# Patient Record
Sex: MALE | Race: WHITE | NOT HISPANIC OR LATINO | Employment: FULL TIME | URBAN - METROPOLITAN AREA
[De-identification: names, ages, dates, MRNs, and addresses within clinical notes are randomized per-mention and may not be internally consistent; named-entity substitution may affect disease eponyms.]

---

## 2017-09-07 ENCOUNTER — OFFICE VISIT - RIVER FALLS (OUTPATIENT)
Dept: FAMILY MEDICINE | Facility: CLINIC | Age: 22
End: 2017-09-07

## 2017-09-07 ASSESSMENT — MIFFLIN-ST. JEOR: SCORE: 1845.82

## 2018-05-24 ENCOUNTER — OFFICE VISIT - RIVER FALLS (OUTPATIENT)
Dept: FAMILY MEDICINE | Facility: CLINIC | Age: 23
End: 2018-05-24

## 2018-05-24 ASSESSMENT — MIFFLIN-ST. JEOR: SCORE: 1869.41

## 2020-02-04 ENCOUNTER — OFFICE VISIT - HEALTHEAST (OUTPATIENT)
Dept: FAMILY MEDICINE | Facility: CLINIC | Age: 25
End: 2020-02-04

## 2020-02-04 ENCOUNTER — COMMUNICATION - HEALTHEAST (OUTPATIENT)
Dept: TELEHEALTH | Facility: CLINIC | Age: 25
End: 2020-02-04

## 2020-02-04 DIAGNOSIS — R10.31 ABDOMINAL PAIN, RIGHT LOWER QUADRANT: ICD-10-CM

## 2020-02-04 LAB
BASOPHILS # BLD AUTO: 0 THOU/UL (ref 0–0.2)
BASOPHILS NFR BLD AUTO: 0 % (ref 0–2)
EOSINOPHIL # BLD AUTO: 0.1 THOU/UL (ref 0–0.4)
EOSINOPHIL NFR BLD AUTO: 1 % (ref 0–6)
ERYTHROCYTE [DISTWIDTH] IN BLOOD BY AUTOMATED COUNT: 13 % (ref 11–14.5)
HCT VFR BLD AUTO: 52.9 % (ref 40–54)
HGB BLD-MCNC: 18.2 G/DL (ref 14–18)
LYMPHOCYTES # BLD AUTO: 1.2 THOU/UL (ref 0.8–4.4)
LYMPHOCYTES NFR BLD AUTO: 18 % (ref 20–40)
MCH RBC QN AUTO: 28.8 PG (ref 27–34)
MCHC RBC AUTO-ENTMCNC: 34.4 G/DL (ref 32–36)
MCV RBC AUTO: 84 FL (ref 80–100)
MONOCYTES # BLD AUTO: 0.7 THOU/UL (ref 0–0.9)
MONOCYTES NFR BLD AUTO: 11 % (ref 2–10)
NEUTROPHILS # BLD AUTO: 4.5 THOU/UL (ref 2–7.7)
NEUTROPHILS NFR BLD AUTO: 70 % (ref 50–70)
PLATELET # BLD AUTO: 293 THOU/UL (ref 140–440)
PMV BLD AUTO: 10.7 FL (ref 8.5–12.5)
RBC # BLD AUTO: 6.33 MILL/UL (ref 4.4–6.2)
WBC: 6.5 THOU/UL (ref 4–11)

## 2020-02-04 ASSESSMENT — MIFFLIN-ST. JEOR: SCORE: 2042.34

## 2020-02-05 ENCOUNTER — COMMUNICATION - HEALTHEAST (OUTPATIENT)
Dept: SCHEDULING | Facility: CLINIC | Age: 25
End: 2020-02-05

## 2020-04-21 ENCOUNTER — OFFICE VISIT - HEALTHEAST (OUTPATIENT)
Dept: FAMILY MEDICINE | Facility: CLINIC | Age: 25
End: 2020-04-21

## 2020-04-21 DIAGNOSIS — R11.2 NAUSEA AND VOMITING, INTRACTABILITY OF VOMITING NOT SPECIFIED, UNSPECIFIED VOMITING TYPE: ICD-10-CM

## 2020-04-22 ENCOUNTER — COMMUNICATION - HEALTHEAST (OUTPATIENT)
Dept: FAMILY MEDICINE | Facility: CLINIC | Age: 25
End: 2020-04-22

## 2020-04-22 DIAGNOSIS — R11.2 NAUSEA AND VOMITING, INTRACTABILITY OF VOMITING NOT SPECIFIED, UNSPECIFIED VOMITING TYPE: ICD-10-CM

## 2020-04-24 ENCOUNTER — OFFICE VISIT - HEALTHEAST (OUTPATIENT)
Dept: FAMILY MEDICINE | Facility: CLINIC | Age: 25
End: 2020-04-24

## 2020-04-24 ENCOUNTER — COMMUNICATION - HEALTHEAST (OUTPATIENT)
Dept: FAMILY MEDICINE | Facility: CLINIC | Age: 25
End: 2020-04-24

## 2020-04-24 DIAGNOSIS — R05.9 COUGH: ICD-10-CM

## 2020-04-24 DIAGNOSIS — R19.7 DIARRHEA, UNSPECIFIED TYPE: ICD-10-CM

## 2020-04-24 DIAGNOSIS — R11.2 INTRACTABLE VOMITING WITH NAUSEA, UNSPECIFIED VOMITING TYPE: ICD-10-CM

## 2020-04-24 DIAGNOSIS — R50.9 FEVER, UNSPECIFIED FEVER CAUSE: ICD-10-CM

## 2020-04-24 LAB
ALBUMIN SERPL-MCNC: 4.2 G/DL (ref 3.5–5)
ALP SERPL-CCNC: 129 U/L (ref 45–120)
ALT SERPL W P-5'-P-CCNC: 71 U/L (ref 0–45)
ANION GAP SERPL CALCULATED.3IONS-SCNC: 12 MMOL/L (ref 5–18)
AST SERPL W P-5'-P-CCNC: 41 U/L (ref 0–40)
BASOPHILS # BLD AUTO: 0 THOU/UL (ref 0–0.2)
BASOPHILS NFR BLD AUTO: 0 % (ref 0–2)
BILIRUB SERPL-MCNC: 1 MG/DL (ref 0–1)
BUN SERPL-MCNC: 11 MG/DL (ref 8–22)
CALCIUM SERPL-MCNC: 9.9 MG/DL (ref 8.5–10.5)
CHLORIDE BLD-SCNC: 101 MMOL/L (ref 98–107)
CO2 SERPL-SCNC: 26 MMOL/L (ref 22–31)
CREAT SERPL-MCNC: 1.01 MG/DL (ref 0.7–1.3)
DEPRECATED S PYO AG THROAT QL EIA: NORMAL
EOSINOPHIL # BLD AUTO: 0 THOU/UL (ref 0–0.4)
EOSINOPHIL NFR BLD AUTO: 0 % (ref 0–6)
ERYTHROCYTE [DISTWIDTH] IN BLOOD BY AUTOMATED COUNT: 12.7 % (ref 11–14.5)
GFR SERPL CREATININE-BSD FRML MDRD: >60 ML/MIN/1.73M2
GLUCOSE BLD-MCNC: 91 MG/DL (ref 70–125)
HCT VFR BLD AUTO: 46 % (ref 40–54)
HGB BLD-MCNC: 16.2 G/DL (ref 14–18)
LIPASE SERPL-CCNC: 13 U/L (ref 0–52)
LYMPHOCYTES # BLD AUTO: 1.3 THOU/UL (ref 0.8–4.4)
LYMPHOCYTES NFR BLD AUTO: 14 % (ref 20–40)
MCH RBC QN AUTO: 28.9 PG (ref 27–34)
MCHC RBC AUTO-ENTMCNC: 35.2 G/DL (ref 32–36)
MCV RBC AUTO: 82 FL (ref 80–100)
MONOCYTES # BLD AUTO: 0.2 THOU/UL (ref 0–0.9)
MONOCYTES NFR BLD AUTO: 3 % (ref 2–10)
NEUTROPHILS # BLD AUTO: 7.7 THOU/UL (ref 2–7.7)
NEUTROPHILS NFR BLD AUTO: 82 % (ref 50–70)
PLATELET # BLD AUTO: 399 THOU/UL (ref 140–440)
PMV BLD AUTO: 10.5 FL (ref 8.5–12.5)
POTASSIUM BLD-SCNC: 3.6 MMOL/L (ref 3.5–5)
PROT SERPL-MCNC: 8.1 G/DL (ref 6–8)
RBC # BLD AUTO: 5.61 MILL/UL (ref 4.4–6.2)
SODIUM SERPL-SCNC: 139 MMOL/L (ref 136–145)
WBC: 9.3 THOU/UL (ref 4–11)

## 2020-04-25 ENCOUNTER — VIRTUAL VISIT (OUTPATIENT)
Dept: FAMILY MEDICINE | Facility: OTHER | Age: 25
End: 2020-04-25

## 2020-04-25 ENCOUNTER — NURSE TRIAGE (OUTPATIENT)
Dept: NURSING | Facility: CLINIC | Age: 25
End: 2020-04-25

## 2020-04-25 LAB — GROUP A STREP BY PCR: NORMAL

## 2020-04-25 NOTE — TELEPHONE ENCOUNTER
"Summary: NONE     Reason for call:  Symptom   Symptom or request: COVID 19 symptoms; fever ; shortness of breath; sweating; nausea; dizzy;  Was seen yesterday at [facility]; patient had a CT done.  Needs results and would like to know what he should be doing right now.     Duration (how long have symptoms been present): 4 days   Have you been treated for this before? Yes     Additional comments: [facility]     Phone number to reach patient:  Home number on file [phone] (home)     Best Time:  ANYTIME     Can we leave a detailed message on this number?  YES     Travel screening: Not Applicable           Outbound call to patient re: above message.  Patient says he started getting sick 2 weeks ago Wed with nauseas/vomiting, nightly sweating, and diarrhea.  Patient says on Wed, he started having a fever started and shortness of breath.  Patient says he was seen yesterday, a \"CT of abdomen\" done and was diagnosed \"mild walking pneumonia.\"  Patient report some chest pain when breathes in deep but says is feeling much better today.  Patient was started on an antibiotic for his pneumonia and prilosec.  Patient says he is still having a productive coughing, diarrhea, vomiting and fever/chills (temp going up to 100.4 F).  Patient has questions about home remedies for nausea.  Reviewed care advice with caller per RN triage protocol.  FNA advised to call back with any concerns.   Caller verbalized understanding and agrees with plan.      COVID 19 Nurse Triage Plan/Patient Instructions    Please be aware that novel coronavirus (COVID-19) may be circulating in the community. If you develop symptoms such as fever, cough, or SOB or if you have concerns about the presence of another infection including coronavirus (COVID-19), please contact your health care provider or visit [email or web address].     Disposition/Instructions    Patient to stay at home and follow home care protocol based instructions.     Thank you for " limiting contact with others, wearing a simple mask to cover your cough, practice good hand hygiene habits and accessing our virtual services where possible to limit the spread of this virus.    For more information about COVID19 and options for caring for yourself at home, please visit the CDC website at https://www.cdc.gov/coronavirus/2019-ncov/about/steps-when-sick.html  For more options for care at [facility], please visit our website at [email or web address]    For more information, please use the [place] Department of Health COVID-19 Website: https://www.health.[facility]./diseases/coronavirus/index.html  [place] Department of Health ([facility]) COVID-19 Hotlines (Interpreters available):      Health questions: Phone Number: [phone] or 1-207.399.1614 and Hours: 7 a.m. to 7 p.m.    Schools and  questions: Phone Number: [phone] or [phone] and Hours 7 a.m. to 7 p.m.

## 2020-04-25 NOTE — TELEPHONE ENCOUNTER
Additional Information    Negative: Shock suspected (e.g., cold/pale/clammy skin, too weak to stand, low BP, rapid pulse)    Negative: Sounds like a life-threatening emergency to the triager    Negative: [1] Nausea or vomiting AND [2] pregnancy < 20 weeks    Negative: Menstrual Period - Missed or Late (i.e., pregnancy suspected)    Negative: Heat exhaustion suspected (i.e., dehydration from heat exposure)    Negative: Motion sickness suspected (i.e., nausea with car, plane, boat, or train travel)    Negative: Anxiety or stress suspected (i.e., nausea with anxiety attacks or stressful situations)    Negative: Traumatic Brain Injury (TBI) suspected    Negative: Nausea (or Vomiting) in a cancer patient who is currently (or recently) receiving chemotherapy or radiation therapy, or cancer patient who has metastatic or end-stage cancer and is receiving palliative care    Negative: Vomiting occurs    Negative: Other symptom is present, see that guideline.  (e.g., chest pain, headache, dizziness, abdominal pain, colds, sore throat, etc.).    Negative: Unable to walk, or can only walk with assistance (e.g., requires support)    Negative: Difficulty breathing    Negative: [1] Insulin-dependent diabetes (Type I) AND [2] glucose > 400 mg/dl (22 mmol/l)    Negative: [1] Drinking very little AND [2] dehydration suspected (e.g., no urine > 12 hours, very dry mouth, very lightheaded)    Negative: Patient sounds very sick or weak to the triager    Negative: Fever > 104 F (40 C)    Negative: [1] Fever > 101 F (38.3 C) AND [2] age > 60    Negative: [1] Fever > 100.0 F (37.8 C) AND [2] bedridden (e.g., nursing home patient, CVA, chronic illness, recovering from surgery)    Negative: [1] Fever > 100.0 F (37.8 C) AND [2] diabetes mellitus or weak immune system (e.g., HIV positive, cancer chemo, splenectomy, chronic steroids)    Negative: Taking any of the following medications: digoxin (Lanoxin), lithium, theophylline, phenytoin  (Dilantin)    Negative: Yellowish color of the skin or white of the eye (i.e., jaundice)    Negative: Fever present > 3 days (72 hours)    Negative: Receiving cancer chemotherapy medication    Negative: Taking prescription medication that could cause nausea (e.g., narcotics/opiates, antibiotics, OCPs, many others)    Negative: Nausea persists > 1 week    Negative: Alcohol or drug abuse, known or suspected    Negative: Nausea is a chronic symptom (recurrent or ongoing AND present > 4 weeks)    Unexplained nausea    Protocols used: NAUSEA-A-AH

## 2020-04-26 NOTE — PROGRESS NOTES
"Date: 2020 08:40:59  Clinician: Raquel Valentine  Clinician NPI: [id number]  Patient: John Law  Patient : 1995  Patient Address: [address] [place] [address] [address], [place] [address]  Patient Phone: [phone]  Visit Protocol: URI  Patient Summary:  John is a 25 year old ( : 1995 ) male who initiated a Visit for COVID-19 (Coronavirus) evaluation and screening. When asked the question \"Please sign me up to receive news, health information and promotions. \", John responded \"No\".    John states his symptoms started gradually 3-4 days ago.   His symptoms consist of a cough, malaise, a headache, chills, vomiting, nausea, and diarrhea. He is experiencing mild difficulty breathing with activities but can speak normally in full sentences. John also feels feverish.   Symptom details     Cough: John coughs every 5-10 minutes and his cough is not more bothersome at night. Phlegm comes into his throat when he coughs. He does not believe his cough is caused by post-nasal drip. The color of the phlegm is clear, white, and yellow.     Temperature: His current temperature is 99.5 degrees Fahrenheit.     Headache: He states the headache is severe (7-9 on a 10 point pain scale).      John denies having facial pain or pressure, sore throat, nasal congestion, ear pain, myalgias, rhinitis, wheezing, teeth pain, ageusia, and anosmia. He also denies double sickening (worsening symptoms after initial improvement), having recent facial or sinus surgery in the past 60 days, and having a sinus infection within the past year.   Precipitating events  He has not recently been exposed to someone with influenza. John has not been in close contact with any high risk individuals.   Pertinent COVID-19 (Coronavirus) information  John has not traveled internationally or to the areas where COVID-19 (Coronavirus) is widespread, including cruise ship travel in the last 14 days before the start of his symptoms.   John does not work " or volunteer as healthcare worker or a  and does not work or volunteer in a healthcare facility.   He does not live with a healthcare worker.   John has not had a close contact with a laboratory-confirmed COVID-19 patient within 14 days of symptom onset. He also has not had a close contact with a suspected COVID-19 patient within 14 days of symptom onset.   Pertinent medical history  John has taken an antibiotic medication in the past month. Antibiotic details as reported by the patient (free text): doxycycline hyclate. He began taking this yesterday for walking pneumonia   John needs a return to work/school note.   Weight: 220 lbs   John does not smoke or use smokeless tobacco.   Additional information as reported by the patient (free text): We went to a walk in clinic yesterday and the provider thought he may have COVID-19. He sent him home with antibiotics to treat walking pneumonia due to finding fluid in his lungs after a CT Scan. Since then, John has been puking non-stop this morning and is concerned he needs to go to the ER. He has had a fever, vomiting, headache, and coughing for a few days now.   Weight: 220 lbs  A synchronous phone visit was initiated by the provider for the following reason: need more information    MEDICATIONS: doxycycline hyclate oral, ALLERGIES: NKDA  Clinician Response:  Dear John,  I am sorry you are not feeling well. Additional information was needed to clarify some of the details provided during your Visit. Because I was unable to reach you, I would like you to be seen in person to further discuss your health history and symptoms so you get the most appropriate care for you.  You will not be charged for this Visit. Thank you for trusting us with your care.  COVID-19 (Coronavirus) General Information  Because there is currently no vaccine to prevent infection, the best way to protect yourself is to avoid being exposed to this virus. Common symptoms of COVID-19 include  but are not limited to fever, cough, and shortness of breath. These symptoms appear 2-14 days after you are exposed to the virus that causes COVID-19. Click here for more information from the CDC on how to protect yourself.  If you are sick with COVID-19 or suspect you are infected with the virus that causes COVID-19, follow the steps here from the CDC to help prevent the disease from spreading to people in your home and community.  Click here for general information from the CDC on testing.  If you develop any of these emergency warning signs for COVID-19, get medical attention immediately:     Trouble breathing    Persistent pain or pressure in the chest    New confusion or inability to arouse    Bluish lips or face      Call your doctor or clinic before going in. Call 911 if you have a medical emergency and notify the  you have or think you may have COVID-19.  For more detailed and up to date information on COVID-19 (Coronavirus), please visit the CDC website.   Diagnosis: Unable to contact patient  Diagnosis ICD: R69  Triage Notes: Attempted to call patient 3 different times and there was no answer or answering machine. Will send message to submit another visit if he still wants to be evaluated.  Synchronous Triage: phone, status: incomplete, duration: 162 seconds

## 2020-04-27 ENCOUNTER — COMMUNICATION - HEALTHEAST (OUTPATIENT)
Dept: EMERGENCY MEDICINE | Facility: HOSPITAL | Age: 25
End: 2020-04-27

## 2020-07-23 ENCOUNTER — OFFICE VISIT - HEALTHEAST (OUTPATIENT)
Dept: FAMILY MEDICINE | Facility: CLINIC | Age: 25
End: 2020-07-23

## 2020-07-23 DIAGNOSIS — K21.9 GASTROESOPHAGEAL REFLUX DISEASE, ESOPHAGITIS PRESENCE NOT SPECIFIED: ICD-10-CM

## 2020-07-23 RX ORDER — OMEPRAZOLE 40 MG/1
40 CAPSULE, DELAYED RELEASE ORAL DAILY
Qty: 30 CAPSULE | Refills: 1 | Status: SHIPPED | OUTPATIENT
Start: 2020-07-23

## 2020-09-17 ENCOUNTER — COMMUNICATION - HEALTHEAST (OUTPATIENT)
Dept: FAMILY MEDICINE | Facility: CLINIC | Age: 25
End: 2020-09-17

## 2020-09-17 DIAGNOSIS — K21.9 GASTROESOPHAGEAL REFLUX DISEASE, ESOPHAGITIS PRESENCE NOT SPECIFIED: ICD-10-CM

## 2021-01-26 ENCOUNTER — OFFICE VISIT - HEALTHEAST (OUTPATIENT)
Dept: FAMILY MEDICINE | Facility: CLINIC | Age: 26
End: 2021-01-26

## 2021-01-26 DIAGNOSIS — L08.9 INFECTED SEBACEOUS CYST: ICD-10-CM

## 2021-01-26 DIAGNOSIS — L72.3 INFECTED SEBACEOUS CYST: ICD-10-CM

## 2021-01-26 ASSESSMENT — MIFFLIN-ST. JEOR: SCORE: 2120.53

## 2021-01-28 LAB — BACTERIA SPEC CULT: NORMAL

## 2021-05-27 VITALS
HEART RATE: 80 BPM | RESPIRATION RATE: 14 BRPM | SYSTOLIC BLOOD PRESSURE: 119 MMHG | DIASTOLIC BLOOD PRESSURE: 78 MMHG | TEMPERATURE: 98.4 F | OXYGEN SATURATION: 99 %

## 2021-05-27 VITALS
SYSTOLIC BLOOD PRESSURE: 133 MMHG | RESPIRATION RATE: 16 BRPM | OXYGEN SATURATION: 95 % | DIASTOLIC BLOOD PRESSURE: 77 MMHG | HEART RATE: 97 BPM | TEMPERATURE: 98.3 F

## 2021-05-27 VITALS
OXYGEN SATURATION: 94 % | SYSTOLIC BLOOD PRESSURE: 148 MMHG | HEART RATE: 113 BPM | TEMPERATURE: 100.1 F | DIASTOLIC BLOOD PRESSURE: 73 MMHG | RESPIRATION RATE: 16 BRPM

## 2021-06-04 VITALS
DIASTOLIC BLOOD PRESSURE: 90 MMHG | BODY MASS INDEX: 35.26 KG/M2 | WEIGHT: 238.06 LBS | HEART RATE: 103 BPM | RESPIRATION RATE: 16 BRPM | SYSTOLIC BLOOD PRESSURE: 144 MMHG | HEIGHT: 69 IN | TEMPERATURE: 97.9 F | OXYGEN SATURATION: 96 %

## 2021-06-05 VITALS
RESPIRATION RATE: 16 BRPM | HEART RATE: 76 BPM | BODY MASS INDEX: 37.55 KG/M2 | SYSTOLIC BLOOD PRESSURE: 126 MMHG | DIASTOLIC BLOOD PRESSURE: 70 MMHG | TEMPERATURE: 98.3 F | WEIGHT: 253.56 LBS | HEIGHT: 69 IN

## 2021-06-05 NOTE — TELEPHONE ENCOUNTER
Patient calling. He reports he was seen at the [facility] yesterday for vomiting, diarrhea, and lower right and left pain.  Patient reports he had serious vomiting and diarrhea on Sunday morning,'  He denies fever at all during this time.  He reports a rough evening again last night, with vomiting, and pain in lower abdomen. He reports the MD he saw at [facility], thought there was a chance that he did have appendicitis .  Patient is concerned that he may have appendicitis, and is asking to be seen today.  Patient was advised to be seen at the [facility], and an appointment   Was made for patient to be seen this AM with Dr. Lange at 11:00am this morning.    Paris Burr RN  [facility] Triage/refill nurse

## 2021-06-07 NOTE — TELEPHONE ENCOUNTER
Patient Returning Call  Reason for call:  Return call to Arsen Vargas PA-C  Information relayed to patient:  There is no information to relay.  Patient has additional questions:  No  If YES, what are your questions/concerns:  n/a  Okay to leave a detailed message?: Yes

## 2021-06-07 NOTE — TELEPHONE ENCOUNTER
Call and spoke with patient and message was given. Patient states will stop taking zofran and will take new Rx. Patient states he had a fever (100) yesterday along with hot and cold flashes, patient states temp at 99 today and feeling a bit better. Informed patient to contact oncare if symptoms don't improved the new couple days. oncare number was given. No further questions.    GURWINDER Chappell  4/23/2020  11:37 AM

## 2021-06-07 NOTE — TELEPHONE ENCOUNTER
Medication Question or Clarification  Who is calling: The patient   What medication are you calling about (include dose and sig)?: Zofran   Who prescribed the medication?: Erasmo Vargas PA-C  What is your question/concern?: The patient states since starting the Zofran yesterday the patient complains of waking up with a migraine where he vomited this AM. The patient is wandering if he could split the dose of the Zofran or get a different medication.? If a replacement is prescribed the patient would like the prescription at [facility] in [place].  Requested Pharmacy: [facility]  Okay to leave a detailed message?: Yes Call the patient to discuss the options.

## 2021-06-07 NOTE — TELEPHONE ENCOUNTER
Since up to 27% of people can get a headache from ondansetron.,  Ordered hydroxyzine 25 mg-1-2 tabs every 6 hours as needed for nausea or after vomiting.  Quantity 28, refills 0.

## 2021-06-09 NOTE — PROGRESS NOTES
Chief Complaint:    Chief Complaint   Patient presents with     Gastroesophageal Reflux     Pt has been on omep without improvement, taking OTC pepcid, pt has been waking up with bad nausea and sometimes vomiting due to this, improves throughout the day       HPI: John Law is an 25 y.o. male who presents for evaluation and treatment of GERD.  Patient has a Hx of this. He was taking Omeprazole but stopped taking this some time ago.  His symptoms are typically at night.  He does wake up with a sore throat most mornings with a sour taste in his mouth.      ROS:    Review of Systems   Constitutional: Negative.  Negative for activity change, appetite change, fatigue, fever and unexpected weight change.   HENT: Positive for sore throat. Negative for congestion, ear discharge, ear pain, sinus pressure, sinus pain and trouble swallowing.    Eyes: Negative for pain, discharge, redness and itching.   Respiratory: Negative.  Negative for chest tightness, shortness of breath and wheezing.    Cardiovascular: Negative.  Negative for chest pain and palpitations.   Gastrointestinal: Negative.  Negative for abdominal pain, blood in stool, diarrhea, nausea and vomiting.   Endocrine: Negative.  Negative for polydipsia.   Genitourinary: Negative.  Negative for dysuria, frequency and urgency.   Musculoskeletal: Negative.  Negative for arthralgias and myalgias.   Skin: Negative.  Negative for color change, rash and wound.   Allergic/Immunologic: Negative.  Negative for immunocompromised state.   Neurological: Negative.  Negative for dizziness and headaches.   Hematological: Negative.  Negative for adenopathy.        Family History  No family history on file.    Social History  Social History     Socioeconomic History     Marital status: Single     Spouse name: Not on file     Number of children: Not on file     Years of education: Not on file     Highest education level: Not on file   Occupational History     Not on file   Social  Needs     Financial resource strain: Not on file     Food insecurity     Worry: Not on file     Inability: Not on file     Transportation needs     Medical: Not on file     Non-medical: Not on file   Tobacco Use     Smoking status: Never Smoker     Smokeless tobacco: Never Used   Substance and Sexual Activity     Alcohol use: Not on file     Drug use: Not on file     Sexual activity: Not on file   Lifestyle     Physical activity     Days per week: Not on file     Minutes per session: Not on file     Stress: Not on file   Relationships     Social connections     Talks on phone: Not on file     Gets together: Not on file     Attends Restorationist service: Not on file     Active member of club or organization: Not on file     Attends meetings of clubs or organizations: Not on file     Relationship status: Not on file     Intimate partner violence     Fear of current or ex partner: Not on file     Emotionally abused: Not on file     Physically abused: Not on file     Forced sexual activity: Not on file   Other Topics Concern     Not on file   Social History Narrative     Not on file        Surgical History:  No past surgical history on file.     Problem List:  Patient Active Problem List   Diagnosis     Suspected COVID-19 virus infection     Nausea with vomiting     Hypokalemia     Shortness of breath        Allergies:  No Known Allergies     Current Meds:    Current Outpatient Medications:      famotidine (PEPCID) 20 MG tablet, Take 1 tablet (20 mg total) by mouth 2 (two) times a day as needed for heartburn., Disp: 50 tablet, Rfl: 0     omeprazole (PRILOSEC) 40 MG capsule, Take 1 capsule (40 mg total) by mouth daily., Disp: 30 capsule, Rfl: 1     ondansetron (ZOFRAN-ODT) 4 MG disintegrating tablet, Take 1 tablet (4 mg total) by mouth every 8 (eight) hours as needed for nausea., Disp: 20 tablet, Rfl: 0     PHYSICAL EXAM:   Vital signs noted and reviewed by Malik Aldana    /78   Pulse 80   Temp 98.4  F (36.9  C)  (Oral)   Resp 14   SpO2 99%      PEFR:  Physical Exam  Vitals signs reviewed.   Constitutional:       General: He is not in acute distress.     Appearance: Normal appearance. He is well-developed. He is not ill-appearing, toxic-appearing or diaphoretic.   HENT:      Head: Normocephalic and atraumatic.      Right Ear: No drainage, swelling or tenderness. Tympanic membrane is not perforated, erythematous, retracted or bulging.      Left Ear: No drainage, swelling or tenderness. Tympanic membrane is not perforated, erythematous, retracted or bulging.      Nose: No mucosal edema, congestion or rhinorrhea.      Mouth/Throat:      Pharynx: Posterior oropharyngeal erythema present. No pharyngeal swelling, oropharyngeal exudate or uvula swelling.      Tonsils: No tonsillar exudate or tonsillar abscesses. 0 on the right. 0 on the left.   Eyes:      General: Lids are normal.         Right eye: No foreign body, discharge or hordeolum.         Left eye: No foreign body or discharge.      Extraocular Movements:      Right eye: Normal extraocular motion.      Left eye: Normal extraocular motion.      Conjunctiva/sclera:      Right eye: Right conjunctiva is not injected. No chemosis or exudate.     Left eye: Left conjunctiva is not injected. No chemosis or exudate.  Neck:      Musculoskeletal: Full passive range of motion without pain and normal range of motion. Normal range of motion. No edema, erythema or neck rigidity.      Thyroid: No thyroid mass.   Cardiovascular:      Rate and Rhythm: Normal rate and regular rhythm.      Pulses: Normal pulses.      Heart sounds: Normal heart sounds, S1 normal and S2 normal. No murmur. No friction rub. No gallop.    Pulmonary:      Effort: Pulmonary effort is normal. No accessory muscle usage, respiratory distress or retractions.      Breath sounds: Normal breath sounds. No stridor, decreased air movement or transmitted upper airway sounds. No decreased breath sounds, wheezing, rhonchi or  rales.   Abdominal:      General: Bowel sounds are normal. There is no distension.      Palpations: Abdomen is soft. There is no mass or pulsatile mass.      Tenderness: There is no abdominal tenderness. There is no right CVA tenderness, left CVA tenderness, guarding or rebound.   Musculoskeletal:      Right lower leg: No edema.      Left lower leg: No edema.   Lymphadenopathy:      Head:      Right side of head: No submental, submandibular, tonsillar, preauricular or posterior auricular adenopathy.      Left side of head: No submental, submandibular, tonsillar, preauricular or posterior auricular adenopathy.      Cervical: No cervical adenopathy.      Right cervical: No superficial cervical adenopathy.     Left cervical: No superficial cervical adenopathy.   Skin:     General: Skin is warm.      Coloration: Skin is not cyanotic or jaundiced.   Neurological:      Mental Status: He is alert and oriented to person, place, and time.      Cranial Nerves: Cranial nerves are intact.      Sensory: Sensation is intact.      Motor: Motor function is intact. No weakness or abnormal muscle tone.      Gait: Gait is intact. Gait normal.   Psychiatric:         Attention and Perception: Attention normal.         Mood and Affect: Mood normal.         Speech: Speech normal.         Behavior: Behavior normal. Behavior is cooperative.          Labs:     Medical Decision Making:    Differential Diagnosis:    GERD     ASSESSMENT:     1. Gastroesophageal reflux disease, esophagitis presence not specified         PLAN:     Discussed home care for this including elevation of head of bed.  Limiting food intake close to bed time.  Reduce alcohol and caffeine intake.    New Rx for Omeprazole sent in.  Patient instructed to take this until advised by his primary provider to stop.    Order placed for him to follow up with gastro for possible upper endoscopy.  Patient will stop at the  and make an appointment to establish care at this  location.   Sooner if symptoms worsen.  Worrisome symptoms discussed with instructions to go to the ED.  Patient verbalized understanding and agreed with this plan.     Malik Aldana  7/23/2020, 12:29 PM

## 2021-06-14 NOTE — PATIENT INSTRUCTIONS - HE
1. Cephalexin 500 mg three times daily for 10 days.  2. Apply warm wet washcloth to affected area for 20 minutes twice daily until healed.  3. We will notify you of culture results.  4. When able, I would recommend consultation with ENT about removal of cyst.

## 2021-06-14 NOTE — PROGRESS NOTES
Assessment/Plan:      Problem List Items Addressed This Visit     None      Visit Diagnoses     Infected sebaceous cyst    -  Primary    Relevant Medications    cephalexin (KEFLEX) 500 MG capsule    Other Relevant Orders    Culture, Wound      This appeared to be a mildly infected sebaceous cyst. Given the location in front of his ear I expressed concern about underlying nerves and vasculature. For this reason I recommended evaluation by ENT for cyst excision. The patient reports he can't afford to see an ENT at this time. We discussed a simple superficial incision and drainage and the patient wanted to proceed with that. The I and D was successful as below. Patient instructed to apply warm pack twice daily until healed. Given copious purulent discharge, culture done and cephalexin prescribed. Patient also aware cyst will likely recur and ultimately evaluation by ENT would be the best option.    Patient Instructions   1. Cephalexin 500 mg three times daily for 10 days.  2. Apply warm wet washcloth to affected area for 20 minutes twice daily until healed.  3. We will notify you of culture results.  4. When able, I would recommend consultation with ENT about removal of cyst.        Subjective:   John Law is a 25 y.o. male who presents today with concerns about a cyst on his left cheek area. It was surgically removed about 6 years ago. I has recurred over the past year. He has inflammation over Thanksgiving. That improved with ice but the cyst has persisted. The patient is requesting I and D of the lesion today.    Patient Active Problem List   Diagnosis     GERD (gastroesophageal reflux disease)     Hypokalemia     Shortness of breath      History reviewed. No pertinent past medical history.  Past Surgical History:   Procedure Laterality Date     CYST REMOVAL      left facial area     Review of System: Relevant items noted in HPI. ROS otherwise negative.     Objective:     Vitals:    01/26/21 1458   BP: 126/70  "  Pulse: 76   Resp: 16   Temp: 98.3  F (36.8  C)   TempSrc: Oral   Weight: (!) 253 lb 9 oz (115 kg)   Height: 5' 9\" (1.753 m)       Physical Exam  Constitutional:       General: He is not in acute distress.     Appearance: He is not ill-appearing.   Cardiovascular:      Rate and Rhythm: Normal rate and regular rhythm.      Heart sounds: No murmur.   Pulmonary:      Effort: Pulmonary effort is normal.      Breath sounds: Normal breath sounds. No wheezing or rhonchi.   Skin:     Comments: 2 cm mass consistent with infected sebaceous cyst in front of left ear.     Incision and drainage    Date/Time: 1/26/2021 3:00 PM  Performed by: Augustina Burnett MD  Authorized by: Augustina Burnett MD       [place] Protocol    Site marked: NA    Prior images obtained and reviewed: NA    Required items: required blood products, implants, devices, and special equipment available    Patient identity confirmed: verbally with patient    Reevaluation: NA - No sedation, light sedation, or local anesthesia    Confirmation checklist: relevant allergies    Time out: Immediately prior to procedure a time out was called to verify the correct patient, procedure, equipment, support staff and site/side marked as required    Universal Protocol: Joint Commission Universal Protocol was followed    Preparation: Patient was prepped and draped in the usual sterile fashion    Indications/Location  Type: cyst  Body area: head  Location: face    Anesthesia    Local anesthesia used?: Yes    Anesthesia: local infiltration    Local anesthetic: lidocaine 1% with epinephrine    Anesthetic total (mL): 1    Sedation  Patient sedation: No    Procedure Details  Risk factor: underlying major nerve  Scalpel size: 11  Incision type: single straight (5 mm)  Incision depth: dermal  Drainage: purulent (and sebaceaous material)  Drainage amount: copious  Wound treatment: wound left open  Packing material: none      Post-procedure   Length of time physician present " for 1:1 monitoring during sedation: 0

## 2021-06-16 PROBLEM — E87.6 HYPOKALEMIA: Status: ACTIVE | Noted: 2020-04-27

## 2021-06-16 PROBLEM — K21.9 GERD (GASTROESOPHAGEAL REFLUX DISEASE): Status: ACTIVE | Noted: 2020-04-27

## 2021-06-16 PROBLEM — R06.02 SHORTNESS OF BREATH: Status: ACTIVE | Noted: 2020-04-27

## 2021-06-18 NOTE — PATIENT INSTRUCTIONS - HE
"Patient Instructions by Arsen Vargas PA-C at 4/24/2020  1:30 PM     Author: Arsen Vargas PA-C Service: -- Author Type: Physician Assistant    Filed: 4/24/2020  3:16 PM Encounter Date: 4/24/2020 Status: Signed    : Arsen Vargas PA-C (Physician Assistant)       Patient Education     Vomiting (Adult)  Vomiting is a common symptom that may be due to different causes. These include gastroenteritis (\"stomach flu\"), food poisoning and gastritis. There are other more serious causes of vomiting which may be hard to diagnose early in the illness. Therefore, it is important to watch for the warning signs listed below.  The main danger from repeated vomiting is dehydration. This is due to excess loss of water and minerals from the body. When this occurs, body fluids must be replaced.  Home care    If symptoms are severe, rest at home for the next 24 hours.    Because your symptoms may be from an infection, wash your hands frequently and well, and use alcohol-based  to avoid spreading the infection to others.    Wash your hands for at least 20 seconds. Hum the happy birthday song twice for the correct length of time.    Wash your hands after using the toilet, before and after preparing food, before eating food, after changing a diaper, cleaning a wound, caring for a sick person, and blowing your nose, coughing, or sneezing. You should also wash your hands after caring for someone who is sick, touching pet food, or treats, and touching an animal, or animal waste.    You may use acetaminophen or NSAID medicines like ibuprofen or naproxen to control fever, unless another medicine was prescribed. If you have chronic liver or kidney disease or ever had a stomach ulcer or GI bleeding, talk with your doctor before using these medicines. Aspirin should never be used in anyone under 18 years of age who is ill with a fever. It may cause severe liver damage. Don't use NSAID medicines if you are already " taking one for another condition (like arthritis) or are on aspirin (such as for heart disease, or after a stroke)    Avoid tobacco and alcohol use, which may worsen your symptoms.    If medicines for vomiting were prescribed, take as directed.    Once vomiting stops, then follow these guidelines:  During the first 12 to 24 hours follow the diet below:    Fruit juices. Apple, grape juice, clear fruit drinks, and electrolyte replacement drinks.    Beverages. Soft drinks without caffeine; mineral water (plain or flavored), decaffeinated tea and coffee.    Soups. Clear broth, consommé and bouillon    Desserts. Plain gelatin, popsicles and fruit juice bars. As you feel better, you may add 6-8 ounces of yogurt per day.  During the next 24 hours you may add the following to the above:    Hot cereal, plain toast, bread, rolls, crackers    Plain noodles, rice, mashed potatoes, chicken noodle or rice soup    Unsweetened canned fruit (avoid pineapple), bananas    Limit caffeine and chocolate. No spices or seasonings except salt.  During the next 24 hours:  Gradually resume a normal diet, as you feel better and your symptoms lessen.  Follow-up care  Follow up with your healthcare provider, or as advised.  When to seek medical advice  Call your healthcare provider right away if any of these occur:    Constant right-sided lower abdominal pain or increasing general abdominal pain    Continued vomiting (unable to keep liquids down) for 24 hours    Frequent diarrhea (more than 5 times a day); blood (red or black color) or mucus in diarrhea    Reduced urine output or extreme thirst    Weakness, dizziness or fainting    Unusually drowsy or confused    Fever of 100.4 F (38 C) oral or higher, or as directed    Yellow color of the eyes or skin  Date Last Reviewed: 11/16/2015 2000-2017 [employer]. [address] [place] [address], [place] [address]. All rights reserved. This information is not intended as a substitute for  professional medical care. Always follow your healthcare professional's instructions.

## 2021-06-18 NOTE — PATIENT INSTRUCTIONS - HE
Patient Instructions by Logan Thrasher PA-C at 2/4/2020  9:10 AM     Author: Logan Thrasher PA-C Service: -- Author Type: Physician Assistant    Filed: 2/4/2020 11:24 AM Encounter Date: 2/4/2020 Status: Addendum    : Logan Thrasher PA-C (Physician Assistant)    Related Notes: Original Note by Logan Thrasher PA-C (Physician Assistant) filed at 2/4/2020 11:23 AM         Monitor your symptoms for the next 24 to 48 hours to ensure they are not getting worse.  You should get good resolution of the nausea vomiting and diarrhea and return of your appetite.  Also you need to have 100% resolution of the right lower quadrant pain.  If you are having worsening of your symptoms no resolution of the right lower quadrant pain or increased vomiting and diarrhea and have a follow-up in 48 hours for recheck of the abdomen to ensure that there is not involvement of the appendix.      Patient Education     Abdominal Pain, Possible Appendicitis, Repeat Exam, Male    Based on your visit today, the exact cause of your abdominal (stomach) pain is not certain. You have some of the early symptoms of appendicitis. Because these symptoms can be similar to other stomach problems, however, the diagnosis cannot be made at this time.  Waiting for more time to pass and repeating the exam is the best way to find out whether you have appendicitis. Within the next 12 to 24 hours, the cause of your stomach pain should become clear. During this time, you need to watch for any new symptoms or worsening of your condition. (See below.)  Symptoms  The most common symptom of appendicitis is pain in the abdomen. Since the appendix is in the lower right part of the abdomen, that is the most common place to have pain. Typically, the pain starts near the navel (belly button) and then moves lower and to the right over hours. The pain also tends to worsen over time. It may hurt especially when moving, straining, or coughing.  Other symptoms include:    Loss of  appetite    Nausea, vomiting    Low-grade fever    Constipation or diarrhea    Not passing gas  Home care    Rest until your next exam. No lifting, straining, or strenuous activities.    You may be told not to eat or drink anything before your next exam. Be sure to follow any instructions youre given. If you are allowed to eat:  ? Eat a diet low in fiber (called a low-residue diet). Foods allowed include refined breads, white rice, fruit and vegetable juices without pulp, and tender meats. These foods will pass more easily through the colon.  ? Don't eat whole-grain foods, whole fruits and vegetables, meats, seeds and nuts, fried or fatty foods, dairy, alcohol, or spicy foods.   Follow-up care  Return for your next exam as advised.  When to seek medical advice  Call your healthcare provider treatment right away if any of these occur:    Fever of 100.4 F (38 C) or higher, or as directed by your provider    Pain gets worse or moves to the lower right part of your abdomen    Nausea or vomiting gets worse    Diarrhea or constipation gets worse    Blood in your vomit or stool (dark red or black color)    Belly (abdomen) becomes swollen    Weakness or dizziness  Call 911  Call 911 right away if:    You have trouble breathing.    You become very confused or sleepy.    You feel faint or lose consciousness.    You have an usually fast heart rate.  Date Last Reviewed: 6/24/2015 2000-2017 [employer]. [address] [place] [address]. All rights reserved. This information is not intended as a substitute for professional medical care. Always follow your healthcare professional's instructions.

## 2021-06-18 NOTE — PATIENT INSTRUCTIONS - HE
Patient Instructions by Malik Aldana PA-C at 7/23/2020 12:30 PM     Author: Malik Aldana PA-C Service: -- Author Type: Physician Assistant    Filed: 7/23/2020 12:31 PM Encounter Date: 7/23/2020 Status: Signed    : Malik Aldana PA-C (Physician Assistant)         Patient Education     Lifestyle Changes for Controlling GERD  When you have GERD, stomach acid feels as if its backing up toward your mouth. Making lifestyle changes can often improve your symptoms. This is true if you take medicine to control your GERD or not. Talk with your healthcare provider about the following suggestions. They may help you get relief from your symptoms.      Raise your head  Reflux is more likely to happen when youre lying down flat. That's because stomach fluid can flow backward more easily. Raising the head of your bed 4 to 6 inches can help. To do this:    Slide blocks or books under the legs at the head of your bed. Or put a wedge under the mattress. Many [employer] can make a wedge for you. The wedge should go from your waist to the top of your head.    Dont just prop your head up on a few pillows. This increases pressure on your stomach. It can make GERD worse.  Watch your eating habits  Certain foods may increase the acid in your stomach. Or they may relax the lower esophageal sphincter. This makes GERD more likely. Its best to avoid the following if they cause you symptoms:    Coffee, tea, and carbonated drinks (with and without caffeine)    Fatty, fried, or spicy food    Mint, chocolate, onions, tomatoes, and citrus    Peppermint    Any other foods that seem to irritate your stomach or cause you pain  Relieve the pressure  Tips include the following:    Eat smaller meals, even if you have to eat more often.    Dont lie down right after you eat. Wait a few hours for your stomach to empty.    Don't wear tight belts or tight-fitting clothes.    Lose any extra weight.  Tobacco and alcohol  Don't smoke tobacco or  drink alcohol. They can make GERD symptoms worse.  Date Last Reviewed: 7/1/2016 2000-2019 [employer]. [address] [facility], [place] [address]. All rights reserved. This information is not intended as a substitute for professional medical care. Always follow your healthcare professional's instructions.

## 2021-06-18 NOTE — PATIENT INSTRUCTIONS - HE
Patient Instructions by rAsen Vargas PA-C at 4/21/2020 12:00 PM     Author: Arsen Vagras PA-C Service: -- Author Type: Physician Assistant    Filed: 4/21/2020 12:38 PM Encounter Date: 4/21/2020 Status: Signed    : Arsen Vargas PA-C (Physician Assistant)       You were seen today for acid reflux. Recommend follow-up with your primary care provider in 48-72 hours for ongoing management.    Symptom management:  - Omeprazole: Administer 30 to 60 minutes before meals; may take with antacids. If administering twice daily, first dose should be administered before breakfast and the second dose before dinner    Reasons to be re-evaluated immediately:  - Develop a fever of 100.4 or higher  - Blood present in vomit or stools  - Develop chest pain or pressure      What Is GERD (Acid Reflux)?     With GERD, the weak LES allows food and fluids to travel back, or reflux, into the esophagus.      If you often have a painful burning feeling in your chest after you eat, you may have gastroesophageal reflux disease (GERD). Heartburn that keeps coming back is a classic symptom of GERD. But you may have other symptoms as well. A GERD diagnosis is made only after a complete evaluation by your healthcare provider.  Note: Chest pain may also be caused by heart problems. Be sure to have all chest pain evaluated by a healthcare provider.   When you have a reflux problem  After you eat, food travels from your mouth down the esophagus to your stomach. Along the way, food passes through a one-way valve called the lower esophageal sphincter (LES). The LES sits at the opening to your stomach. Normally the LES opens when you swallow. It lets food enter the stomach, then closes quickly. With GERD, the LES doesnt work normally. It lets food and stomach acid flow back (reflux) into the esophagus.  Some common symptoms    Frequent heartburn or burping    Sour-tasting fluid backing up into your mouth    Symptoms that get  worse after you eat, bend over, or lie down    Trouble swallowing or pain when swallowing    A dry, long-term (chronic) cough    Upset stomach (nausea) or vomiting  Relieving your discomfort  You and your healthcare provider can work together to find the treatment options that best ease your symptoms. These may include lifestyle changes, medicine, and possibly surgery.  Many people find their GERD symptoms decrease when they eat small frequent meals instead of 3 large ones. Reducing the amount of fatty foods in your diet will also help.   The following foods tend to cause problems for people diagnosed with GERD:    Tomatoes and tomato products    Alcohol    Coffee    Peppermint    Greasy or spicy foods  Talk with your provider if you dont understand how to make the dietary changes needed to control your GERD symptoms. Your provider can refer you to a nutritionist.  Date Last Reviewed: 7/1/2016 2000-2016 [employer]. [address] [place] [address], [place] [address]. All rights reserved. This information is not intended as a substitute for professional medical care. Always follow your healthcare professional's instructions.

## 2021-06-20 ENCOUNTER — HEALTH MAINTENANCE LETTER (OUTPATIENT)
Age: 26
End: 2021-06-20

## 2021-06-20 NOTE — LETTER
Letter by Shasta Nunez RN at      Author: Shasta Nunez RN Service: -- Author Type: --    Filed:  Encounter Date: 4/27/2020 Status: (Other)       4/27/2020        John Law  [address]  [facility] [id number]    This letter provides a written record that you were tested for COVID-19 on 4/27/2020.     Your result was negative.    This means that we didnt find the virus that causes COVID-19 in your sample. A test may show negative when you do actually have the virus. This can happen when the virus is in the early stages of infection, before you feel illness symptoms.    Even if you dont have symptoms, they may still appear. For safety, its very important to follow these rules.    Keep yourself away from others (self-isolation):      Stay home. Dont go to work, school or anywhere else.     Stay away from others in your home. No hugging, kissing or shaking hands.    Dont let anyone visit.    Cover your mouth and nose with a mask, tissue or wash cloth to avoid spreading germs.    Stay in self-isolation until you meet ALL of the guidelines below:    1. You have had no fever for at least 72 hours (that is 3 full days of no fever without the use of medicine that reduces fevers), AND  2. other symptoms (such as cough, shortness of breath) have gotten better, AND  3. at least 7 days have passed since your symptoms first appeared.    Going back to work  Check with your employer for any guidelines to follow for going back to work.    Employers: This document serves as formal notice that your employee tested negative for COVID-19, as of the testing date shown above.    For questions regarding this letter or your Negative COVID-19 result, call [phone] between 8A to 6:30P (M-F) and 10A to 6:30P (weekends).

## 2021-06-20 NOTE — LETTER
Letter by Logan Thrasher PA-C at      Author: Logan Thrasher PA-C Service: -- Author Type: --    Filed:  Encounter Date: 2/4/2020 Status: (Other)         February 4, 2020     Patient: John Law   YOB: 1995   Date of Visit: 2/4/2020       To Whom it May Concern:    John Law was seen in my clinic on 2/4/2020.    If you have any questions or concerns, please don't hesitate to call.    Sincerely,         Electronically signed by Logan Thrasher PA-C

## 2021-06-20 NOTE — LETTER
Letter by Arsen Vargas PA-C at      Author: Arsen Vargas PA-C Service: -- Author Type: --    Filed:  Encounter Date: 4/24/2020 Status: (Other)         April 24, 2020     Patient: John Law   YOB: 1995   Date of Visit: 4/24/2020       To Whom it May Concern:    John Law was seen in my clinic on 4/24/2020.  He is excused from work for 4/24/2020 until 5/1/2020.  We suspect that patient most likely has COVID-19 based on his symptoms. He may then return to work as long as no fevers for 72 hours AND has significant improvement in cough and shortness of breath.    If you have any questions or concerns, please don't hesitate to call.    Sincerely,         Electronically signed by Arsen Vargas PA-C

## 2021-06-20 NOTE — LETTER
Letter by Arsen Vargas PA-C at      Author: Arsen Vargas PA-C Service: -- Author Type: --    Filed:  Encounter Date: 4/21/2020 Status: (Other)         April 21, 2020     Patient: John Law   YOB: 1995   Date of Visit: 4/21/2020       To Whom it May Concern:    John Law was seen in my clinic on 4/21/2020. He is excused from work for 4/21/2020 - 4/24/2020. He may return sooner as long as symptoms are improved or as tolerated.    If you have any questions or concerns, please don't hesitate to call.    Sincerely,         Electronically signed by Arsen Vargas PA-C

## 2021-06-28 NOTE — PROGRESS NOTES
"Progress Notes by Logan Thrasher PA-C at 2/4/2020  9:10 AM     Author: Logan Thrasher PA-C Service: -- Author Type: Physician Assistant    Filed: 3/15/2020  9:45 PM Encounter Date: 2/4/2020 Status: Signed    : Logan Thrasher PA-C (Physician Assistant)       Subjective:      Patient ID: John Law is a 24 y.o. male.    Chief Complaint:    HPI  John Law is a 24 y.o. male who presents today complaining of 2-day history of nausea vomiting and diarrhea but none today.  There is been no blood or mucus in the emesis or diarrhea.  Patient has been unable to sleep.  There is been no current loss of appetite.  Patient has not had lightheadedness dizziness syncope abdominal pain skin rash or urinary symptoms.  Pain in the right lower quadrant has been generalized it is dull and achy.     Patient has not tried treatment for this at home.    Patient is requesting a note for work.      No past medical history on file.    No past surgical history on file.    No family history on file.    Social History     Tobacco Use   ? Smoking status: Never Smoker   ? Smokeless tobacco: Never Used   Substance Use Topics   ? Alcohol use: Not on file   ? Drug use: Not on file       Review of Systems  As above in HPI, otherwise balance of Review of Systems are negative.    Objective:     /90 (Patient Site: Right Arm, Patient Position: Sitting, Cuff Size: Adult Large)   Pulse (!) 103   Temp 97.9  F (36.6  C) (Oral)   Resp 16   Ht 5' 8.5\" (1.74 m)   Wt (!) 238 lb 1 oz (108 kg)   SpO2 96%   BMI 35.67 kg/m      Physical Exam  General: Patient is resting comfortably no acute distress is afebrile  HEENT: Head is normocephalic atraumatic   eyes are PERRL EOMI sclera anicteric   TMs are clear bilaterally  Throat is with mild pharyngeal wall erythema and no exudate  No cervical lymphadenopathy present  LUNGS: Clear to auscultation bilaterally  HEART: Regular rate and rhythm  Abdomen: Right lower quadrant is tender to " palpation.  No noted rebound guarding or peritoneal signs at this time.  No CVA tenderness to percussion  Skin: Without rash non-diaphoretic    Lab:  Results for orders placed or performed in visit on 02/04/20   HM1 (CBC with Diff)   Result Value Ref Range    WBC 6.5 4.0 - 11.0 thou/uL    RBC 6.33 (H) 4.40 - 6.20 mill/uL    Hemoglobin 18.2 (H) 14.0 - 18.0 g/dL    Hematocrit 52.9 40.0 - 54.0 %    MCV 84 80 - 100 fL    MCH 28.8 27.0 - 34.0 pg    MCHC 34.4 32.0 - 36.0 g/dL    RDW 13.0 11.0 - 14.5 %    Platelets 293 140 - 440 thou/uL    MPV 10.7 8.5 - 12.5 fL    Neutrophils % 70 50 - 70 %    Lymphocytes % 18 (L) 20 - 40 %    Monocytes % 11 (H) 2 - 10 %    Eosinophils % 1 0 - 6 %    Basophils % 0 0 - 2 %    Neutrophils Absolute 4.5 2.0 - 7.7 thou/uL    Lymphocytes Absolute 1.2 0.8 - 4.4 thou/uL    Monocytes Absolute 0.7 0.0 - 0.9 thou/uL    Eosinophils Absolute 0.1 0.0 - 0.4 thou/uL    Basophils Absolute 0.0 0.0 - 0.2 thou/uL     Imaging    I ordered a CT of the abdomen and pelvis it was ordered but never performed.    Assessment:     Procedures    The encounter diagnosis was Abdominal pain, right lower quadrant.    Plan:     1. Abdominal pain, right lower quadrant  HM1(CBC and Differential)    CT Abdomen Pelvis Without Oral With IV Contrast    HM1 (CBC with Diff)    ondansetron (ZOFRAN) 4 MG tablet       Patient was scheduled to have a CT of the abdomen and pelvis.  He did not stay to get the study completed.  Advised patient I am unable to rule out an appendicitis.    Advised the patient that I cannot rule out an appendicitis.  It could be an early appendicitis since there is right lower quadrant pain.  CBC does not rule out appendicitis as there may be a 20% false-negative rate with the blood test.  Patient does need imaging.  Patient was unsure of going to the emergency room so we will do monitoring for 24 hours to 48 hours and if symptoms worsen proceed to emergency room for evaluation and treatment.    Patient  Instructions     Monitor your symptoms for the next 24 to 48 hours to ensure they are not getting worse.  You should get good resolution of the nausea vomiting and diarrhea and return of your appetite.  Also you need to have 100% resolution of the right lower quadrant pain.  If you are having worsening of your symptoms no resolution of the right lower quadrant pain or increased vomiting and diarrhea and have a follow-up in 48 hours for recheck of the abdomen to ensure that there is not involvement of the appendix.      Patient Education     Abdominal Pain, Possible Appendicitis, Repeat Exam, Male    Based on your visit today, the exact cause of your abdominal (stomach) pain is not certain. You have some of the early symptoms of appendicitis. Because these symptoms can be similar to other stomach problems, however, the diagnosis cannot be made at this time.  Waiting for more time to pass and repeating the exam is the best way to find out whether you have appendicitis. Within the next 12 to 24 hours, the cause of your stomach pain should become clear. During this time, you need to watch for any new symptoms or worsening of your condition. (See below.)  Symptoms  The most common symptom of appendicitis is pain in the abdomen. Since the appendix is in the lower right part of the abdomen, that is the most common place to have pain. Typically, the pain starts near the navel (belly button) and then moves lower and to the right over hours. The pain also tends to worsen over time. It may hurt especially when moving, straining, or coughing.  Other symptoms include:    Loss of appetite    Nausea, vomiting    Low-grade fever    Constipation or diarrhea    Not passing gas  Home care    Rest until your next exam. No lifting, straining, or strenuous activities.    You may be told not to eat or drink anything before your next exam. Be sure to follow any instructions youre given. If you are allowed to eat:  ? Eat a diet low in fiber  (called a low-residue diet). Foods allowed include refined breads, white rice, fruit and vegetable juices without pulp, and tender meats. These foods will pass more easily through the colon.  ? Don't eat whole-grain foods, whole fruits and vegetables, meats, seeds and nuts, fried or fatty foods, dairy, alcohol, or spicy foods.   Follow-up care  Return for your next exam as advised.  When to seek medical advice  Call your healthcare provider treatment right away if any of these occur:    Fever of 100.4 F (38 C) or higher, or as directed by your provider    Pain gets worse or moves to the lower right part of your abdomen    Nausea or vomiting gets worse    Diarrhea or constipation gets worse    Blood in your vomit or stool (dark red or black color)    Belly (abdomen) becomes swollen    Weakness or dizziness  Call 911  Call 911 right away if:    You have trouble breathing.    You become very confused or sleepy.    You feel faint or lose consciousness.    You have an usually fast heart rate.  Date Last Reviewed: 6/24/2015 2000-2017 [employer]. [address] [place] [address], [place] [address]. All rights reserved. This information is not intended as a substitute for professional medical care. Always follow your healthcare professional's instructions.

## 2021-06-29 NOTE — PROGRESS NOTES
Progress Notes by Arsen Vargas PA-C at 4/21/2020 12:00 PM     Author: Arsen Vargas PA-C Service: -- Author Type: Physician Assistant    Filed: 4/21/2020  1:36 PM Encounter Date: 4/21/2020 Status: Signed    : Arsen Vargas PA-C (Physician Assistant)         Assessment & Plan:       1. Nausea and vomiting, intractability of vomiting not specified, unspecified vomiting type  omeprazole (PRILOSEC) 20 MG capsule    ondansetron (ZOFRAN-ODT) 4 MG disintegrating tablet      Medical Decision Making  Patient presents with acute onset nausea vomiting most consistent with acid reflux.  His symptoms do not appear to be due to a viral gastroenteritis given its course of initial acute onset with improvement for 2 days, and then return of symptoms over the last 24 hours.  Also have low concerns for bacterial gastroenteritis as patient has no increased risk factors with no recent travel and no recent antibiotic use.  Do not feel this is gastric ulcers patient has now hematemesis or black ground coffee appearance.  Patient further has no specific point tenderness over the gallbladder or appendix to rule out cholecystitis and appendicitis.  Will treat patient with Zofran for the nausea and oral omeprazole to help with suspected acid reflux.  Recommended he may use over-the-counter Tums for immediate symptoms.  Patient to follow-up with primary care in 72 hours for symptom recheck.    Patient Instructions     You were seen today for acid reflux. Recommend follow-up with your primary care provider in 48-72 hours for ongoing management.    Symptom management:  - Omeprazole: Administer 30 to 60 minutes before meals; may take with antacids. If administering twice daily, first dose should be administered before breakfast and the second dose before dinner    Reasons to be re-evaluated immediately:  - Develop a fever of 100.4 or higher  - Blood present in vomit or stools  - Develop chest pain or pressure      What Is  GERD (Acid Reflux)?     With GERD, the weak LES allows food and fluids to travel back, or reflux, into the esophagus.      If you often have a painful burning feeling in your chest after you eat, you may have gastroesophageal reflux disease (GERD). Heartburn that keeps coming back is a classic symptom of GERD. But you may have other symptoms as well. A GERD diagnosis is made only after a complete evaluation by your healthcare provider.  Note: Chest pain may also be caused by heart problems. Be sure to have all chest pain evaluated by a healthcare provider.   When you have a reflux problem  After you eat, food travels from your mouth down the esophagus to your stomach. Along the way, food passes through a one-way valve called the lower esophageal sphincter (LES). The LES sits at the opening to your stomach. Normally the LES opens when you swallow. It lets food enter the stomach, then closes quickly. With GERD, the LES doesnt work normally. It lets food and stomach acid flow back (reflux) into the esophagus.  Some common symptoms    Frequent heartburn or burping    Sour-tasting fluid backing up into your mouth    Symptoms that get worse after you eat, bend over, or lie down    Trouble swallowing or pain when swallowing    A dry, long-term (chronic) cough    Upset stomach (nausea) or vomiting  Relieving your discomfort  You and your healthcare provider can work together to find the treatment options that best ease your symptoms. These may include lifestyle changes, medicine, and possibly surgery.  Many people find their GERD symptoms decrease when they eat small frequent meals instead of 3 large ones. Reducing the amount of fatty foods in your diet will also help.   The following foods tend to cause problems for people diagnosed with GERD:    Tomatoes and tomato products    Alcohol    Coffee    Peppermint    Greasy or spicy foods  Talk with your provider if you dont understand how to make the dietary changes needed to  control your GERD symptoms. Your provider can refer you to a nutritionist.  Date Last Reviewed: 7/1/2016 2000-2016 [employer]. [address] [place] [address], [place] [address]. All rights reserved. This information is not intended as a substitute for professional medical care. Always follow your healthcare professional's instructions.                Subjective:       John Law is a 24 y.o. male here for evaluation of nausea and vomiting.  Onset of symptoms was 5 days ago.  Patient noticed initial acute onset of symptoms of nausea and vomiting but then improved for a couple of days before returning in the last 24 hours.  Patient symptoms are worse at nighttime and in the morning.  He has had 4-5 episodes of emesis since onset with 2 of those occurring last night.  Vomit appears as loose bile and clear fluid.  Associated symptoms include sensation of heartburn, loose stools described as green in color, poor appetite, and a mild cough while the patient is having episodes of emesis.  He denies hematemesis, black coffee-ground emesis, analgesia, and melena.  He further denies fevers, sore throat, and significant abdominal pains.  Patient states he recently started drinking coffee and has had a change in his diet as he has not been working from home.  He has noted milder symptoms of heartburn and acid reflux in the past with good relief from Tums.  He has not tried Tums for his current symptoms.  Patient has had no recent travel or recent antibiotic use.    The following portions of the patient's history were reviewed and updated as appropriate: allergies, current medications and problem list.    Review of Systems  Pertinent items are noted in HPI.     Allergies  No Known Allergies    No family history on file.    Social History     Socioeconomic History   ? Marital status: Single     Spouse name: None   ? Number of children: None   ? Years of education: None   ? Highest education level: None    Occupational History   ? None   Social Needs   ? Financial resource strain: None   ? Food insecurity     Worry: None     Inability: None   ? Transportation needs     Medical: None     Non-medical: None   Tobacco Use   ? Smoking status: Never Smoker   ? Smokeless tobacco: Never Used   Substance and Sexual Activity   ? Alcohol use: None   ? Drug use: None   ? Sexual activity: None   Lifestyle   ? Physical activity     Days per week: None     Minutes per session: None   ? Stress: None   Relationships   ? Social connections     Talks on phone: None     Gets together: None     Attends Gnosticism service: None     Active member of club or organization: None     Attends meetings of clubs or organizations: None     Relationship status: None   ? Intimate partner violence     Fear of current or ex partner: None     Emotionally abused: None     Physically abused: None     Forced sexual activity: None   Other Topics Concern   ? None   Social History Narrative   ? None         Objective:       /77   Pulse 97   Temp 98.3  F (36.8  C)   Resp 16   SpO2 95%   General appearance: alert, appears stated age, cooperative, no distress and non-toxic  Head: Normocephalic, without obvious abnormality, atraumatic  Throat: lips, mucosa, and tongue normal; teeth and gums normal  Neck: no adenopathy and supple, symmetrical, trachea midline  Back: No CVA tenderness  Lungs: clear to auscultation bilaterally  Heart: regular rate and rhythm, S1, S2 normal, no murmur, click, rub or gallop  Abdomen: soft, non-tender; bowel sounds normal; no masses,  no organomegaly  Skin: Skin color, texture, turgor normal. No rashes or lesions

## 2021-06-29 NOTE — PROGRESS NOTES
Progress Notes by Arsen Vargas PA-C at 4/24/2020  1:30 PM     Author: Arsen Vargas PA-C Service: -- Author Type: Physician Assistant    Filed: 4/24/2020  4:45 PM Encounter Date: 4/24/2020 Status: Signed    : Arsen Vargas PA-C (Physician Assistant)         Assessment & Plan:       1. Fever, unspecified fever cause  HM1(CBC and Differential)    HM1 (CBC with Diff)    Rapid Strep A Screen-Throat swab    Group A Strep, RNA Direct Detection, Throat    Comprehensive Metabolic Panel    Lipase    doxycycline (VIBRA-TABS) 100 MG tablet   2. Intractable vomiting with nausea, unspecified vomiting type  CT Abdomen Without Oral With IV Contrast   3. Diarrhea, unspecified type  CANCELED: H. pylori Antigen, Stool    CANCELED: Culture, Stool    CANCELED: Ova and Parasite, Stool   4. Cough        Medical Decision Making  Patient presents with ongoing emesis and new onset fevers with new concerns for COVID-19 infection.  Initial work-up was to rule out bowel obstruction and diverticulitis as patient is having new onset fevers, poor appetite, and ongoing episodes of emesis.  Obtained a rapid strep which is negative at this time for strep pharyngitis.  CBC showed normal white blood cell count.  Obtained an abdominal CT that showed no signs of diverticulitis and no bowel obstruction.  The CT scan did however note trace bilateral pleural effusions of the lungs possibly consistent with COVID-19.  This would explain the patient's lowered oxygen saturation to 94% on room air despite having no shortness of breath and clear lung sounds on auscultation.  Patient further does not appear in respiratory distress.  Feel that the slight tachycardia at 113 is likely due to mild dehydration, and recommended the patient continue to drink fluids.  Did state that the patient would benefit from IV fluids in the emergency room, but he refused this treatment at this time.  We will cancel test for stool cultures to rule out H.  "pylori and bacterial gastroenteritis given lung findings.  Patient to discontinue hydroxyzine and omeprazole which are originally used to treat the nausea and concern for possible acid reflux respectively.  Called and discussed patient case with ED provider from [facility] emergency room and we came to the conclusion that the patient likely does not need hospitalization, but should continue to monitor symptoms closely.  Will sign patient up for get well loop for ongoing monitoring.  Also prescribed oral doxycycline for possible bacterial pneumonia given pleural effusions.  Provided work note.  Discussed signs of worsening symptoms and when to be seen immediately for evaluation in the emergency room.    Patient Instructions   Patient Education     Vomiting (Adult)  Vomiting is a common symptom that may be due to different causes. These include gastroenteritis (\"stomach flu\"), food poisoning and gastritis. There are other more serious causes of vomiting which may be hard to diagnose early in the illness. Therefore, it is important to watch for the warning signs listed below.  The main danger from repeated vomiting is dehydration. This is due to excess loss of water and minerals from the body. When this occurs, body fluids must be replaced.  Home care    If symptoms are severe, rest at home for the next 24 hours.    Because your symptoms may be from an infection, wash your hands frequently and well, and use alcohol-based  to avoid spreading the infection to others.    Wash your hands for at least 20 seconds. Hum the happy birthday song twice for the correct length of time.    Wash your hands after using the toilet, before and after preparing food, before eating food, after changing a diaper, cleaning a wound, caring for a sick person, and blowing your nose, coughing, or sneezing. You should also wash your hands after caring for someone who is sick, touching pet food, or treats, and touching an animal, or " animal waste.    You may use acetaminophen or NSAID medicines like ibuprofen or naproxen to control fever, unless another medicine was prescribed. If you have chronic liver or kidney disease or ever had a stomach ulcer or GI bleeding, talk with your doctor before using these medicines. Aspirin should never be used in anyone under 18 years of age who is ill with a fever. It may cause severe liver damage. Don't use NSAID medicines if you are already taking one for another condition (like arthritis) or are on aspirin (such as for heart disease, or after a stroke)    Avoid tobacco and alcohol use, which may worsen your symptoms.    If medicines for vomiting were prescribed, take as directed.    Once vomiting stops, then follow these guidelines:  During the first 12 to 24 hours follow the diet below:    Fruit juices. Apple, grape juice, clear fruit drinks, and electrolyte replacement drinks.    Beverages. Soft drinks without caffeine; mineral water (plain or flavored), decaffeinated tea and coffee.    Soups. Clear broth, consommé and bouillon    Desserts. Plain gelatin, popsicles and fruit juice bars. As you feel better, you may add 6-8 ounces of yogurt per day.  During the next 24 hours you may add the following to the above:    Hot cereal, plain toast, bread, rolls, crackers    Plain noodles, rice, mashed potatoes, chicken noodle or rice soup    Unsweetened canned fruit (avoid pineapple), bananas    Limit caffeine and chocolate. No spices or seasonings except salt.  During the next 24 hours:  Gradually resume a normal diet, as you feel better and your symptoms lessen.  Follow-up care  Follow up with your healthcare provider, or as advised.  When to seek medical advice  Call your healthcare provider right away if any of these occur:    Constant right-sided lower abdominal pain or increasing general abdominal pain    Continued vomiting (unable to keep liquids down) for 24 hours    Frequent diarrhea (more than 5 times a  day); blood (red or black color) or mucus in diarrhea    Reduced urine output or extreme thirst    Weakness, dizziness or fainting    Unusually drowsy or confused    Fever of 100.4 F (38 C) oral or higher, or as directed    Yellow color of the eyes or skin  Date Last Reviewed: 11/16/2015 2000-2017 [employer]. [address] [place] [address], [place] [address]. All rights reserved. This information is not intended as a substitute for professional medical care. Always follow your healthcare professional's instructions.                 Subjective:       John Law is a 25 y.o. male here for evaluation of ongoing emesis and now new onset fevers.  Patient was seen in the walk-in care clinic by myself 3 days ago and his symptoms have not improved, but slightly worsened.  Patient has now had symptoms for over 1 week of emesis, nausea, headaches, and fatigue.  Associated symptoms include occasional coughing, most frequently with the episodes of emesis, and dizziness.  He has had 4-5 episodes of emesis in the last 3 days, still appearing as bile are clear fluids.  He now notes in the last 2 days a fever of 100.7 max that is on and off throughout the day.  Does have good temporary relief of symptoms and fever with Tylenol.  Patient has been tolerating some small amounts of fluids appropriately, but is unable to tolerate any solid foods.  He denies throat pain, abdominal pains, back pains, and urinary symptoms including burning in urination and urinary frequency.  Patient does note that he has a family history of hypothyroidism as well as diabetes.  At her previous visit he was instructed to take oral Zofran and omeprazole.  He noticed that he developed worsening headaches while taking the Zofran, but he is unsure as he also recently stopped drinking caffeine.  Through telephone counter 2 days ago he was told to discontinue Zofran and instead take hydroxyzine for nausea.  He has continued to take omeprazole with  no relief of symptoms.    The following portions of the patient's history were reviewed and updated as appropriate: allergies, current medications and problem list.    Review of Systems  A 12 point comprehensive review of systems was negative except as noted.     Allergies  No Known Allergies    No family history on file.    Social History     Socioeconomic History   ? Marital status: Single     Spouse name: Not on file   ? Number of children: Not on file   ? Years of education: Not on file   ? Highest education level: Not on file   Occupational History   ? Not on file   Social Needs   ? Financial resource strain: Not on file   ? Food insecurity     Worry: Not on file     Inability: Not on file   ? Transportation needs     Medical: Not on file     Non-medical: Not on file   Tobacco Use   ? Smoking status: Never Smoker   ? Smokeless tobacco: Never Used   Substance and Sexual Activity   ? Alcohol use: Not on file   ? Drug use: Not on file   ? Sexual activity: Not on file   Lifestyle   ? Physical activity     Days per week: Not on file     Minutes per session: Not on file   ? Stress: Not on file   Relationships   ? Social connections     Talks on phone: Not on file     Gets together: Not on file     Attends [employer] service: Not on file     Active member of club or organization: Not on file     Attends meetings of clubs or organizations: Not on file     Relationship status: Not on file   ? Intimate partner violence     Fear of current or ex partner: Not on file     Emotionally abused: Not on file     Physically abused: Not on file     Forced sexual activity: Not on file   Other Topics Concern   ? Not on file   Social History Narrative   ? Not on file         Objective:       /73   Pulse (!) 113   Temp 100.1  F (37.8  C)   Resp 16   SpO2 94%   General appearance: alert, appears stated age, cooperative, no distress and non-toxic  Head: Normocephalic, without obvious abnormality, atraumatic  Throat: lips,  mucosa, and tongue normal; teeth and gums normal  Neck: no adenopathy and supple, symmetrical, trachea midline  Back: No CVA tenderness  Lungs: clear to auscultation bilaterally  Heart: regular rate and rhythm, S1, S2 normal, no murmur, click, rub or gallop  Abdomen: Mild tenderness to palpation of the left lower quadrant, reduced bowel sounds throughout, no masses organomegaly, abdomen soft  Skin: Skin color, texture, turgor normal. No rashes or lesions     Lab Results    Recent Results (from the past 24 hour(s))   Rapid Strep A Screen-Throat swab    Specimen: Throat   Result Value Ref Range    Rapid Strep A Antigen No Group A Strep detected, presumptive negative No Group A Strep detected, presumptive negative   HM1 (CBC with Diff)   Result Value Ref Range    WBC 9.3 4.0 - 11.0 thou/uL    RBC 5.61 4.40 - 6.20 mill/uL    Hemoglobin 16.2 14.0 - 18.0 g/dL    Hematocrit 46.0 40.0 - 54.0 %    MCV 82 80 - 100 fL    MCH 28.9 27.0 - 34.0 pg    MCHC 35.2 32.0 - 36.0 g/dL    RDW 12.7 11.0 - 14.5 %    Platelets 399 140 - 440 thou/uL    MPV 10.5 8.5 - 12.5 fL    Neutrophils % 82 (H) 50 - 70 %    Lymphocytes % 14 (L) 20 - 40 %    Monocytes % 3 2 - 10 %    Eosinophils % 0 0 - 6 %    Basophils % 0 0 - 2 %    Neutrophils Absolute 7.7 2.0 - 7.7 thou/uL    Lymphocytes Absolute 1.3 0.8 - 4.4 thou/uL    Monocytes Absolute 0.2 0.0 - 0.9 thou/uL    Eosinophils Absolute 0.0 0.0 - 0.4 thou/uL    Basophils Absolute 0.0 0.0 - 0.2 thou/uL     I personally reviewed these results and discussed findings with the patient.    Imaging    Ct Abdomen Without Oral With Iv Contrast    Result Date: 4/24/2020  EXAM DATE:         04/24/2020 EXAM: CT ABDOMEN, PELVIS WITH CONTRAST LOCATION: [facility] DATE/TIME: 4/24/2020 3:00 PM INDICATION: Indication Not Listed - See Reason for Exam ongoing emesis and fevers for 1 week with LLQ abdominal tenderness; rule out diverticulitis, colitis, bowel obstruction COMPARISON: 02/05/2020 TECHNIQUE: CT  scan of the abdomen and pelvis was performed following injection of IV contrast. Multiplanar reformats were obtained. Dose reduction techniques were used. CONTRAST: 100 ml Isovue 370 was administered via iv with 0 mls discarded. FINDINGS: LOWER CHEST: Posterior groundglass opacities favored to represent dependent atelectasis. Trace volume bilateral pleural effusions also present. HEPATOBILIARY: Normal. PANCREAS: Normal. SPLEEN: Normal. ADRENAL GLANDS: Normal. KIDNEYS/BLADDER: Normal. BOWEL: No obstruction or inflammatory change. Normal appendix. LYMPH NODES: No lymphadenopathy. VASCULATURE: Unremarkable. PELVIC ORGANS: Normal bladder and prostate. No free fluid. MUSCULOSKELETAL: Normal. IMPRESSION: 1.  No intra-abdominal process evident to explain symptoms. 2.  Mild groundglass opacities present at lung bases are favored to represent dependent atelectasis and there is trace bilateral pleural effusions. DICOM format image data is available to non-affiliated external healthcare facilities or entities on a secure, media-free, reciprocally searchable basis with patient authorization for at least a 12-month period after the study.     Discussed findings with the patient.

## 2021-10-10 ENCOUNTER — HEALTH MAINTENANCE LETTER (OUTPATIENT)
Age: 26
End: 2021-10-10

## 2022-02-11 VITALS
SYSTOLIC BLOOD PRESSURE: 112 MMHG | WEIGHT: 198.2 LBS | BODY MASS INDEX: 29.36 KG/M2 | HEART RATE: 62 BPM | DIASTOLIC BLOOD PRESSURE: 60 MMHG | HEIGHT: 69 IN

## 2022-02-11 VITALS
HEART RATE: 64 BPM | OXYGEN SATURATION: 97 % | HEIGHT: 69 IN | DIASTOLIC BLOOD PRESSURE: 66 MMHG | SYSTOLIC BLOOD PRESSURE: 118 MMHG | TEMPERATURE: 97.6 F | RESPIRATION RATE: 16 BRPM | WEIGHT: 193 LBS | BODY MASS INDEX: 28.58 KG/M2

## 2022-02-16 NOTE — PROGRESS NOTES
Patient:   DONELL GATICA            MRN: [id number]            FIN: [id number]               Age:   23 years     Sex:  Male     :  1995   Associated Diagnoses:   Skin cyst   Author:   Damien Fajardo MD      Chief Complaint   2018 9:27 AM CDT    c/o cyst on left side of cheek. has had removed there before      History of Present Illness   see chief complaint as noted above and confirmed with the patient   23 year old male presenting with syst on left side of face by his ear. He had a cyst removed in same area previously and was told that it may return. He states that it was shaved off the first time. It is irritating and tender at times.       Review of Systems   Constitutional:  Chills.    Ear/Nose/Mouth/Throat:  No nasal congestion, No sore throat.    Integumentary:  Skin lesion, No rash.    Neurologic:  No headache.    Psychiatric:  Negative.              Health Status   Allergies:    Allergic Reactions (Selected)  No Known Medication Allergies   Medications:  (Selected)      Problem list:    No problem items selected or recorded.      Histories   Past Medical History:    No active or resolved past medical history items have been selected or recorded.   Family History:       Procedure history:    No previous procedures.   Social History:        Alcohol Assessment: Current            Current, Beer (12 oz), 3-5 times per week, 2 drinks/episode average.      Tobacco Assessment            Never      Substance Abuse Assessment: Denies Substance Abuse            Never      Exercise and Physical Activity Assessment            Exercise frequency: 5-6 times/week.      Sexual Assessment            Sexually active: No.  Sexual orientation: Heterosexual.        Physical Examination   Vital Signs   2018 9:27 AM CDT Peripheral Pulse Rate 62 bpm    Systolic Blood Pressure 112 mmHg    Diastolic Blood Pressure 60 mmHg    Mean Arterial Pressure 77 mmHg      Measurements from flowsheet : Measurements   2018  9:27 AM CDT Height Measured - Standard 69 in    Weight Measured - Standard 198.2 lb    BSA 2.09 m2    Body Mass Index 29.27 kg/m2  HI      General:  Alert and oriented, No acute distress.    Eye:  Pupils are equal, round and reactive to light, Normal conjunctiva.    HENT:  Oral mucosa is moist.    Neck:  Supple, No lymphadenopathy.    Gastrointestinal:  Non-distended.    Integumentary:  Warm, No rash, 4mm sebaceous cyst anterior to left ear, mobile.    Psychiatric:  Cooperative, Appropriate mood & affect, Normal judgment.       Review / Management   Results review      Impression and Plan   Diagnosis     Skin cyst ([id number] L72.9).     Course:  due to previous excision at site and being on face recommend he see plastic or derm.    Plan:  Discussed treatment options. He was referred to surgery to have removed. Reviewed expected course, what to watch for, and when to return.   [facility], acted solely as a scribe for, and in the presence of Dr. Damien Fajardo who performed the service..

## 2022-02-16 NOTE — PROGRESS NOTES
"   Patient:   DONELL GATICA            MRN: [id number]            FIN: [id number]               Age:   22 years     Sex:  Male     :  1995   Associated Diagnoses:   Hx of epistaxis   Author:   Deangelo Haji PA-C      Chief Complaint   2017 8:21 AM CDT     Pt here for bloody nose,on left side, that started last night.  Pt states it just stopped bleeding before appt.  Pt states he has been dealing with these on and off x year and have become \"harder to get them stopped\"      History of Present Illness   Chief complaint and symptoms noted above and confirmed with patient   as above  this was his first bloody nose in the past 6 months, got it stop eventually by packing it           Review of Systems   Constitutional:  Chills.    Ear/Nose/Mouth/Throat:  Nasal congestion, Sore throat, No ear pain.    Respiratory:  Cough.       Health Status   Allergies:    Allergic Reactions (Selected)  No Known Medication Allergies   Medications: not on any regular medications      Histories   Past Medical History:    No active or resolved past medical history items have been selected or recorded.   Family History:    Brother: Elie      History is negative.  Mother: Ana      History is negative.  Father: Cassius      History is negative.     Procedure history:    No previous procedures.   Social History:        Alcohol Assessment: Current            Current, Beer (12 oz), 3-5 times per week, 2 drinks/episode average.      Tobacco Assessment            Never      Substance Abuse Assessment: Denies Substance Abuse            Never      Exercise and Physical Activity Assessment            Exercise frequency: 5-6 times/week.      Sexual Assessment            Sexually active: No.  Sexual orientation: Heterosexual.        Physical Examination   Vital Signs   2017 8:21 AM CDT Temperature Tympanic 97.6 DegF  LOW    Peripheral Pulse Rate 64 bpm    Pulse Site Radial artery    Respiratory Rate 16 br/min    Systolic Blood Pressure 118 " mmHg    Diastolic Blood Pressure 66 mmHg    Mean Arterial Pressure 83 mmHg    BP Site Right arm    Oxygen Saturation 97 %      Measurements from flowsheet : Measurements   9/7/2017 8:21 AM CDT Height Measured - Standard 69 in    Weight Measured - Standard 193 lb    BSA 2.06 m2    Body Mass Index 28.5 kg/m2      General:  No acute distress.    HENT:  Tympanic membranes are clear, No pharyngeal erythema, No sinus tenderness, nares are patent but bilaterally septum is red and inflamed, no active areas of bleeding, no scabs or lesions.    Neck:  Supple, Non-tender, No lymphadenopathy.       Impression and Plan   Diagnosis     Hx of epistaxis ([id number] Z87.898).     Summary:  will try to reduce the inflammation of the nose with a course of bactroban, then will use saline spray  if nose bleeds continue will refer to ENT.    Orders     Orders   Pharmacy:  Bactroban 2% topical ointment (Prescribe): 1 carlton, nasal, tid, x 10 day(s), Instructions: apply a thin film to each side of nose, # 22 gm, 1 Refill(s), Type: Maintenance, Pharmacy: [id number], 1 carlton nasal tid,x10 day(s),Instr:apply a thin film to each side of nose.     Orders   Charges (Evaluation and Management):  [id number] office outpatient visit 15 minutes (Charge) (Order): Quantity: 1, Hx of epistaxis.Patient returns 4 hours later with some bleeding from the left nare.  There is a small area of seepage along anterior left midline,  oxymetazoline spray is used and the nose is clamped, then flushed with saline nasal spray and more oxymetazoline spray is used.  Bleeding is eventually stopped.  He will use the bactroban ointment as prescribed this am, use oxymetazoline spray bid for the next 3 days,  will refer to ENT for further evaluation of this.

## 2022-07-02 ENCOUNTER — HOSPITAL ENCOUNTER (EMERGENCY)
Facility: HOSPITAL | Age: 27
Discharge: HOME | End: 2022-07-02
Attending: EMERGENCY MEDICINE | Admitting: EMERGENCY MEDICINE
Payer: COMMERCIAL

## 2022-07-02 VITALS
BODY MASS INDEX: 35.55 KG/M2 | HEART RATE: 65 BPM | WEIGHT: 240 LBS | HEIGHT: 69 IN | DIASTOLIC BLOOD PRESSURE: 70 MMHG | RESPIRATION RATE: 15 BRPM | SYSTOLIC BLOOD PRESSURE: 127 MMHG | OXYGEN SATURATION: 100 %

## 2022-07-02 DIAGNOSIS — R10.11 ABDOMINAL PAIN, RIGHT UPPER QUADRANT: ICD-10-CM

## 2022-07-02 LAB
ALBUMIN SERPL-MCNC: 4.4 G/DL (ref 3.5–5)
ALP SERPL-CCNC: 88 U/L (ref 45–120)
ALT SERPL W P-5'-P-CCNC: 60 U/L (ref 0–45)
ANION GAP SERPL CALCULATED.3IONS-SCNC: 10 MMOL/L (ref 5–18)
AST SERPL W P-5'-P-CCNC: 32 U/L (ref 0–40)
BILIRUB DIRECT SERPL-MCNC: 0.3 MG/DL
BILIRUB SERPL-MCNC: 1.3 MG/DL (ref 0–1)
BUN SERPL-MCNC: 15 MG/DL (ref 8–22)
CALCIUM SERPL-MCNC: 9.5 MG/DL (ref 8.5–10.5)
CHLORIDE BLD-SCNC: 108 MMOL/L (ref 98–107)
CO2 SERPL-SCNC: 20 MMOL/L (ref 22–31)
CREAT SERPL-MCNC: 1.07 MG/DL (ref 0.7–1.3)
ERYTHROCYTE [DISTWIDTH] IN BLOOD BY AUTOMATED COUNT: 13.1 % (ref 10–15)
GFR SERPL CREATININE-BSD FRML MDRD: >90 ML/MIN/1.73M2
GLUCOSE BLD-MCNC: 112 MG/DL (ref 70–125)
HCT VFR BLD AUTO: 49 % (ref 40–53)
HGB BLD-MCNC: 16.6 G/DL (ref 13.3–17.7)
LIPASE SERPL-CCNC: 22 U/L (ref 0–52)
MCH RBC QN AUTO: 28.2 PG (ref 26.5–33)
MCHC RBC AUTO-ENTMCNC: 33.9 G/DL (ref 31.5–36.5)
MCV RBC AUTO: 83 FL (ref 78–100)
PLATELET # BLD AUTO: 338 10E3/UL (ref 150–450)
POTASSIUM BLD-SCNC: 3.7 MMOL/L (ref 3.5–5)
PROT SERPL-MCNC: 7.9 G/DL (ref 6–8)
RBC # BLD AUTO: 5.89 10E6/UL (ref 4.4–5.9)
SODIUM SERPL-SCNC: 138 MMOL/L (ref 136–145)
WBC # BLD AUTO: 5.4 10E3/UL (ref 4–11)

## 2022-07-02 PROCEDURE — 85049 AUTOMATED PLATELET COUNT: CPT | Performed by: EMERGENCY MEDICINE

## 2022-07-02 PROCEDURE — 96361 HYDRATE IV INFUSION ADD-ON: CPT

## 2022-07-02 PROCEDURE — 82248 BILIRUBIN DIRECT: CPT | Performed by: EMERGENCY MEDICINE

## 2022-07-02 PROCEDURE — 83690 ASSAY OF LIPASE: CPT | Performed by: EMERGENCY MEDICINE

## 2022-07-02 PROCEDURE — 258N000003 HC RX IP 258 OP 636: Performed by: EMERGENCY MEDICINE

## 2022-07-02 PROCEDURE — 36415 COLL VENOUS BLD VENIPUNCTURE: CPT | Performed by: EMERGENCY MEDICINE

## 2022-07-02 PROCEDURE — 96375 TX/PRO/DX INJ NEW DRUG ADDON: CPT

## 2022-07-02 PROCEDURE — 96374 THER/PROPH/DIAG INJ IV PUSH: CPT

## 2022-07-02 PROCEDURE — 250N000011 HC RX IP 250 OP 636: Performed by: EMERGENCY MEDICINE

## 2022-07-02 PROCEDURE — 82310 ASSAY OF CALCIUM: CPT | Performed by: EMERGENCY MEDICINE

## 2022-07-02 PROCEDURE — 99284 EMERGENCY DEPT VISIT MOD MDM: CPT | Mod: 25

## 2022-07-02 RX ORDER — KETOROLAC TROMETHAMINE 15 MG/ML
15 INJECTION, SOLUTION INTRAMUSCULAR; INTRAVENOUS ONCE
Status: COMPLETED | OUTPATIENT
Start: 2022-07-02 | End: 2022-07-02

## 2022-07-02 RX ADMIN — KETOROLAC TROMETHAMINE 15 MG: 15 INJECTION, SOLUTION INTRAMUSCULAR; INTRAVENOUS at 07:21

## 2022-07-02 RX ADMIN — FAMOTIDINE 20 MG: 10 INJECTION, SOLUTION INTRAVENOUS at 07:21

## 2022-07-02 RX ADMIN — SODIUM CHLORIDE 1000 ML: 9 INJECTION, SOLUTION INTRAVENOUS at 07:15

## 2022-07-02 ASSESSMENT — ENCOUNTER SYMPTOMS
APPETITE CHANGE: 1
ABDOMINAL PAIN: 1
COUGH: 1
DIARRHEA: 1
DIAPHORESIS: 1
NAUSEA: 1

## 2022-07-02 NOTE — ED PROVIDER NOTES
"EMERGENCY DEPARTMENT ENCOUNTER      NAME: John Law  AGE: 27 year old male  YOB: 1995  MRN: [id number]  EVALUATION DATE & TIME: 7/2/2022  6:51 AM    PCP: No primary care provider on file.    ED PROVIDER: Kendrick Munoz M.D.      Chief Complaint   Patient presents with     Abdominal Pain         FINAL IMPRESSION:  1. Abdominal pain, right upper quadrant          ED COURSE & MEDICAL DECISION MAKING:    Pertinent Labs & Imaging studies reviewed. (See chart for details)  ED Course as of 07/02/22 0808   Sat Jul 02, 2022   0702 Patient is a 27-year-old gentleman with history of GERD who presents with right-sided abdominal pain that began about 24 hours ago, getting worse with time.  He has been coughing a lot over the past week from a \"advised weekend\" smoking a lot of cigarettes and cigars.  He is breathing comfortably, no cough in the room, has mild tenderness to deep touch diffusely but no focal right upper quadrant or right lower quadrant tenderness.  Plan to screen for evidence of biliary obstruction, hepatitis, get a UA to eval for hematuria which would prompt me to get a CT scan to rule out kidney stone although his pain seems to be less severe than I would anticipate for kidney stone - he appears relatively comfortable.  IV fluids, analgesia, Pepcid ordered   0725 WBC: 5.4   0726 Hemoglobin: 16.6   0746 Lipase: 22   0746 Bilirubin Total(!): 1.3  Mild nonspecific elevation   0746 Creatinine: 1.07   0746 Carbon Dioxide(!): 20  Mild NAGMA   0806 I rechecked the patient, he is feeling well.  We discussed reassuring lab work, and return precautions.  Patient was discharged.         Additional ED Course Timestamps:  6:55 AM I met with the patient, obtained history, performed an initial exam, and discussed options and plan for diagnostics and treatment here in the ED.   8:03 AM Checked in on and updated patient.     At the conclusion of the encounter I discussed the results of all of the tests and " "the disposition. The questions were answered. The patient or family acknowledged understanding and was agreeable with the care plan.       MEDICATIONS GIVEN IN THE EMERGENCY:  Medications   0.9% sodium chloride BOLUS (1,000 mLs Intravenous New Bag 7/2/22 0715)   ketorolac (TORADOL) injection 15 mg (15 mg Intravenous Given 7/2/22 0721)   famotidine (PEPCID) injection 20 mg (20 mg Intravenous Given 7/2/22 0721)         NEW PRESCRIPTIONS STARTED AT TODAY'S ER VISIT  New Prescriptions    No medications on file          =================================================================    HPI    Patient information was obtained from: Patient    Use of : N/A         John Law is a 27 year old male with a pertinent history of hypokalemia and GERD who presents to this ED for evaluation of abdominal pain.    On 6/25/2022 - 6/26/2022, patient was partying with friends and endorses smoking \"everything under the sun\", including cigars and cigarettes. Notes a lot of second hand smoke as well. Last week, patient states coughing and nausea. Notes having sweaty palms for the past two weeks which isn't usual for him. He also states slight diarrhea which is normal for him. Endorses he has bad acid reflux which he takes omeprazole for. On 7/01/2022 (yesterday), patient started experiencing dull sore pain on his lower right abdomen, and soreness on his ribcage. States he has hardly been able to eat for the past couple of days. Denies anyone at the party being sick. Patient denies any other complaints at this time.       REVIEW OF SYSTEMS   Review of Systems   Constitutional: Positive for appetite change (decreased) and diaphoresis (hands).   Respiratory: Positive for cough.    Gastrointestinal: Positive for abdominal pain, diarrhea and nausea.   All other systems reviewed and are negative.       PAST MEDICAL HISTORY:  History reviewed. No pertinent past medical history.    PAST SURGICAL HISTORY:  Past Surgical History: " "  Procedure Laterality Date     CYST REMOVAL      left facial area           CURRENT MEDICATIONS:    No current facility-administered medications for this encounter.     Current Outpatient Medications   Medication     omeprazole (PRILOSEC) 40 MG capsule       ALLERGIES:  No Known Allergies    FAMILY HISTORY:  History reviewed. No pertinent family history.    SOCIAL HISTORY:   Social History     Socioeconomic History     Marital status: Single     Spouse name: None     Number of children: None     Years of education: None     Highest education level: None   Tobacco Use     Smoking status: Never Smoker     Smokeless tobacco: Never Used   Substance and Sexual Activity     Alcohol use: Yes     Comment: Alcoholic Drinks/day: rare     Drug use: Never       VITALS:  /69   Pulse 69   Resp 15   Ht 1.753 m (5' 9\")   Wt 108.9 kg (240 lb)   SpO2 99%   BMI 35.44 kg/m      PHYSICAL EXAM    Constitutional: Well developed, well nourished. Comfortable appearing.  HENT: Normocephalic, atraumatic, mucous membranes moist, nose normal. Neck- Supple, gross ROM intact.   Eyes: Pupils mid-range, conjunctiva without injection, no discharge.   Respiratory: Clear to auscultation bilaterally, no respiratory distress, no wheezing, speaks full sentences easily. No cough.  Cardiovascular: Normal heart rate, regular rhythm, no murmurs. No pedal edema, 2+ DP pulses.   GI: Soft, no masses. Mild diffuse abdominal tenderness to deep palpation. No focal tenderness or guarding to RUQ or RLQ.   Musculoskeletal: Moving all 4 extremities intentionally and without pain. No obvious deformity.  Skin: Warm, dry, no rash.  Neurologic: Alert & oriented x 3, cranial nerves grossly intact.  Psychiatric: Affect normal, cooperative.       LAB:  All pertinent labs reviewed and interpreted.  Labs Ordered and Resulted from Time of ED Arrival to Time of ED Departure   BASIC METABOLIC PANEL - Abnormal       Result Value    Sodium 138      Potassium 3.7      " Chloride 108 (*)     Carbon Dioxide (CO2) 20 (*)     Anion Gap 10      Urea Nitrogen 15      Creatinine 1.07      Calcium 9.5      Glucose 112      GFR Estimate >90     HEPATIC FUNCTION PANEL - Abnormal    Bilirubin Total 1.3 (*)     Bilirubin Direct 0.3      Protein Total 7.9      Albumin 4.4      Alkaline Phosphatase 88      AST 32      ALT 60 (*)    CBC WITH PLATELETS - Normal    WBC Count 5.4      RBC Count 5.89      Hemoglobin 16.6      Hematocrit 49.0      MCV 83      MCH 28.2      MCHC 33.9      RDW 13.1      Platelet Count 338     LIPASE - Normal    Lipase 22     ROUTINE UA WITH MICROSCOPIC REFLEX TO CULTURE       RADIOLOGY:  Reviewed all pertinent imaging. Please see official radiology report.  No orders to display         I, Aure Huber, am serving as a scribe to document services personally performed by Dr. Kendrick Munoz based on my observation and the provider's statements to me. I, Kendrick Munoz MD attest that Aure Huber is acting in a scribe capacity, has observed my performance of the services and has documented them in accordance with my direction.    Kendrick Munoz M.D.  Emergency Medicine  [facility] EMERGENCY DEPARTMENT  [address] [facility] [address]  [phone]  Dept: [phone]     Kendrick Munoz MD  07/02/22 0842

## 2022-07-02 NOTE — ED TRIAGE NOTES
Patient discloses right sided abdominal pain.  Patient states that he was smoking a lot last weekend and was coughing a lot and now has right sided abdominal pain.  Patient also states GERD and takes omeprozole.

## 2022-07-02 NOTE — DISCHARGE INSTRUCTIONS
I believe the pain you are having in your abdomen is from the frequent coughing and muscle strain.  There is no sign of any infection, liver injury, gallbladder problem.  I would expect her symptoms to slowly improve over the next week with use of Tylenol, ibuprofen as needed to help with discomfort.    If you develop a fever, vomiting or persistent pain in that area that is getting worse, especially after eating, please come back for reevaluation.

## 2022-07-17 ENCOUNTER — HEALTH MAINTENANCE LETTER (OUTPATIENT)
Age: 27
End: 2022-07-17

## 2022-09-24 ENCOUNTER — HEALTH MAINTENANCE LETTER (OUTPATIENT)
Age: 27
End: 2022-09-24

## 2023-08-05 ENCOUNTER — HEALTH MAINTENANCE LETTER (OUTPATIENT)
Age: 28
End: 2023-08-05

## 2024-09-22 ENCOUNTER — HEALTH MAINTENANCE LETTER (OUTPATIENT)
Age: 29
End: 2024-09-22